# Patient Record
Sex: FEMALE | Race: WHITE | NOT HISPANIC OR LATINO | Employment: FULL TIME | URBAN - METROPOLITAN AREA
[De-identification: names, ages, dates, MRNs, and addresses within clinical notes are randomized per-mention and may not be internally consistent; named-entity substitution may affect disease eponyms.]

---

## 2018-12-07 ENCOUNTER — APPOINTMENT (OUTPATIENT)
Dept: WOMENS IMAGING | Facility: HOSPITAL | Age: 63
End: 2018-12-07

## 2018-12-07 PROCEDURE — 77067 SCR MAMMO BI INCL CAD: CPT | Performed by: RADIOLOGY

## 2018-12-07 PROCEDURE — 77063 BREAST TOMOSYNTHESIS BI: CPT | Performed by: RADIOLOGY

## 2021-09-24 ENCOUNTER — APPOINTMENT (OUTPATIENT)
Dept: WOMENS IMAGING | Facility: HOSPITAL | Age: 66
End: 2021-09-24

## 2021-09-24 PROCEDURE — 77067 SCR MAMMO BI INCL CAD: CPT | Performed by: RADIOLOGY

## 2021-09-24 PROCEDURE — 77063 BREAST TOMOSYNTHESIS BI: CPT | Performed by: RADIOLOGY

## 2021-11-12 ENCOUNTER — APPOINTMENT (OUTPATIENT)
Dept: WOMENS IMAGING | Facility: HOSPITAL | Age: 66
End: 2021-11-12

## 2021-11-12 PROCEDURE — 76641 ULTRASOUND BREAST COMPLETE: CPT | Performed by: RADIOLOGY

## 2021-11-12 PROCEDURE — 77066 DX MAMMO INCL CAD BI: CPT | Performed by: RADIOLOGY

## 2021-11-12 PROCEDURE — G0279 TOMOSYNTHESIS, MAMMO: HCPCS | Performed by: RADIOLOGY

## 2021-11-12 PROCEDURE — 77062 BREAST TOMOSYNTHESIS BI: CPT | Performed by: RADIOLOGY

## 2021-12-20 ENCOUNTER — APPOINTMENT (OUTPATIENT)
Dept: WOMENS IMAGING | Facility: HOSPITAL | Age: 66
End: 2021-12-20

## 2021-12-20 PROCEDURE — 19083 BX BREAST 1ST LESION US IMAG: CPT | Performed by: RADIOLOGY

## 2021-12-20 PROCEDURE — A4648 IMPLANTABLE TISSUE MARKER: HCPCS | Performed by: RADIOLOGY

## 2022-01-14 ENCOUNTER — TELEMEDICINE (OUTPATIENT)
Dept: FAMILY MEDICINE CLINIC | Facility: TELEHEALTH | Age: 67
End: 2022-01-14

## 2022-01-14 ENCOUNTER — HOSPITAL ENCOUNTER (EMERGENCY)
Facility: HOSPITAL | Age: 67
Discharge: HOME | End: 2022-01-14
Attending: EMERGENCY MEDICINE | Admitting: EMERGENCY MEDICINE

## 2022-01-14 VITALS
RESPIRATION RATE: 14 BRPM | HEART RATE: 64 BPM | OXYGEN SATURATION: 97 % | SYSTOLIC BLOOD PRESSURE: 108 MMHG | DIASTOLIC BLOOD PRESSURE: 57 MMHG | TEMPERATURE: 97.9 F

## 2022-01-14 VITALS — BODY MASS INDEX: 28.52 KG/M2 | WEIGHT: 155 LBS | HEIGHT: 62 IN

## 2022-01-14 DIAGNOSIS — R06.2 WHEEZING: ICD-10-CM

## 2022-01-14 DIAGNOSIS — U07.1 COVID-19 VIRUS INFECTION: Primary | ICD-10-CM

## 2022-01-14 PROBLEM — I10 BENIGN ESSENTIAL HYPERTENSION: Status: ACTIVE | Noted: 2017-02-11

## 2022-01-14 PROBLEM — R29.818 FINE MOTOR IMPAIRMENT: Status: ACTIVE | Noted: 2022-01-14

## 2022-01-14 PROBLEM — Z95.0 CARDIAC PACEMAKER IN SITU: Status: ACTIVE | Noted: 2017-06-12

## 2022-01-14 PROBLEM — Z09 OTHER FOLLOW-UP EXAMINATION: Status: ACTIVE | Noted: 2017-07-04

## 2022-01-14 PROBLEM — I10 HYPERTENSIVE DISORDER: Status: ACTIVE | Noted: 2017-06-12

## 2022-01-14 PROBLEM — I48.91 ATRIAL FIBRILLATION: Status: ACTIVE | Noted: 2017-06-12

## 2022-01-14 PROBLEM — Z86.73 HISTORY OF CVA (CEREBROVASCULAR ACCIDENT): Status: ACTIVE | Noted: 2017-02-10

## 2022-01-14 PROBLEM — K21.9 GASTROESOPHAGEAL REFLUX DISEASE WITHOUT ESOPHAGITIS: Status: ACTIVE | Noted: 2017-06-12

## 2022-01-14 PROBLEM — R26.9 ABNORMAL GAIT: Status: ACTIVE | Noted: 2022-01-14

## 2022-01-14 PROBLEM — Z72.0 TOBACCO ABUSE: Status: ACTIVE | Noted: 2017-02-11

## 2022-01-14 PROBLEM — R41.89 IMPAIRED COGNITION: Status: ACTIVE | Noted: 2022-01-14

## 2022-01-14 PROBLEM — M54.50 LBP (LOW BACK PAIN): Status: ACTIVE | Noted: 2022-01-14

## 2022-01-14 PROBLEM — F41.8 MIXED ANXIETY AND DEPRESSIVE DISORDER: Status: ACTIVE | Noted: 2017-06-12

## 2022-01-14 PROBLEM — R89.9 ABNORMAL LABORATORY TEST RESULT: Status: ACTIVE | Noted: 2019-10-21

## 2022-01-14 PROBLEM — R26.89 IMPAIRMENT OF BALANCE: Status: ACTIVE | Noted: 2022-01-14

## 2022-01-14 PROBLEM — D50.9 IRON DEFICIENCY ANEMIA: Status: ACTIVE | Noted: 2019-10-21

## 2022-01-14 PROBLEM — E66.3 OVERWEIGHT WITH BODY MASS INDEX (BMI) 25.0-29.9: Status: ACTIVE | Noted: 2017-06-12

## 2022-01-14 PROBLEM — I49.5 SICK SINUS SYNDROME: Status: ACTIVE | Noted: 2017-04-07

## 2022-01-14 PROBLEM — M54.16 LUMBAR RADICULOPATHY: Status: ACTIVE | Noted: 2017-08-25

## 2022-01-14 PROBLEM — I47.1 PSVT (PAROXYSMAL SUPRAVENTRICULAR TACHYCARDIA) (HCC): Status: ACTIVE | Noted: 2017-04-07

## 2022-01-14 PROBLEM — M81.0 OSTEOPOROSIS: Status: ACTIVE | Noted: 2020-11-13

## 2022-01-14 PROBLEM — R00.1 SINUS BRADYCARDIA: Status: ACTIVE | Noted: 2017-02-11

## 2022-01-14 PROBLEM — G43.909 HEADACHE, MIGRAINE: Status: ACTIVE | Noted: 2022-01-14

## 2022-01-14 PROBLEM — R29.818 SUSPECTED SLEEP APNEA: Status: ACTIVE | Noted: 2017-02-13

## 2022-01-14 PROBLEM — Z84.89 FAMILY HISTORY OF NEOPLASM OF OVARY: Status: ACTIVE | Noted: 2017-09-15

## 2022-01-14 PROBLEM — E78.5 HYPERLIPIDEMIA: Status: ACTIVE | Noted: 2017-06-12

## 2022-01-14 PROBLEM — R29.898 FINE MOTOR IMPAIRMENT: Status: ACTIVE | Noted: 2022-01-14

## 2022-01-14 PROBLEM — Z78.9 ALTERATION IN INSTRUMENTAL ACTIVITIES OF DAILY LIVING (IADL): Status: ACTIVE | Noted: 2022-01-14

## 2022-01-14 PROCEDURE — 99282 EMERGENCY DEPT VISIT SF MDM: CPT

## 2022-01-14 PROCEDURE — 99213 OFFICE O/P EST LOW 20 MIN: CPT | Performed by: NURSE PRACTITIONER

## 2022-01-14 RX ORDER — DENOSUMAB 60 MG/ML
INJECTION SUBCUTANEOUS
COMMUNITY

## 2022-01-14 RX ORDER — METOPROLOL SUCCINATE 25 MG/1
25 TABLET, EXTENDED RELEASE ORAL DAILY
COMMUNITY
Start: 2021-10-31

## 2022-01-14 RX ORDER — GABAPENTIN 400 MG/1
400 CAPSULE ORAL 3 TIMES DAILY
COMMUNITY
Start: 2021-10-22

## 2022-01-14 RX ORDER — DIPHENHYDRAMINE HYDROCHLORIDE 50 MG/ML
50 INJECTION INTRAMUSCULAR; INTRAVENOUS ONCE AS NEEDED
OUTPATIENT
Start: 2022-01-14

## 2022-01-14 RX ORDER — LISINOPRIL 5 MG/1
TABLET ORAL
COMMUNITY

## 2022-01-14 RX ORDER — CLOPIDOGREL BISULFATE 75 MG/1
TABLET ORAL
COMMUNITY
Start: 2021-08-17

## 2022-01-14 RX ORDER — CYCLOBENZAPRINE HCL 10 MG
TABLET ORAL
COMMUNITY
Start: 2013-05-08

## 2022-01-14 RX ORDER — METHYLPREDNISOLONE SODIUM SUCCINATE 125 MG/2ML
125 INJECTION, POWDER, LYOPHILIZED, FOR SOLUTION INTRAMUSCULAR; INTRAVENOUS AS NEEDED
OUTPATIENT
Start: 2022-01-14

## 2022-01-14 RX ORDER — ESCITALOPRAM OXALATE 20 MG/1
TABLET ORAL
COMMUNITY
Start: 2012-11-30

## 2022-01-14 RX ORDER — EPINEPHRINE 1 MG/ML
0.3 INJECTION, SOLUTION, CONCENTRATE INTRAVENOUS AS NEEDED
OUTPATIENT
Start: 2022-01-14

## 2022-01-14 RX ORDER — RISEDRONATE SODIUM 150 MG/1
TABLET, FILM COATED ORAL
COMMUNITY
Start: 2013-05-08

## 2022-01-14 RX ORDER — DIPHENHYDRAMINE HCL 25 MG
50 TABLET ORAL ONCE AS NEEDED
OUTPATIENT
Start: 2022-01-14

## 2022-01-14 RX ORDER — SODIUM CHLORIDE 9 MG/ML
30 INJECTION, SOLUTION INTRAVENOUS ONCE
OUTPATIENT
Start: 2022-01-14

## 2022-01-14 RX ORDER — ALPRAZOLAM 0.5 MG/1
TABLET ORAL
COMMUNITY
Start: 2013-02-28

## 2022-01-14 RX ORDER — ROSUVASTATIN CALCIUM 20 MG/1
TABLET, COATED ORAL
COMMUNITY

## 2022-01-14 RX ORDER — ERGOCALCIFEROL 1.25 MG/1
CAPSULE ORAL
COMMUNITY

## 2022-01-14 RX ORDER — METHYLPREDNISOLONE 4 MG/1
TABLET ORAL
Qty: 21 TABLET | Refills: 0 | Status: SHIPPED | OUTPATIENT
Start: 2022-01-14

## 2022-01-14 NOTE — PATIENT INSTRUCTIONS
If not able to get Antibody infusion, you may try the Medrol dose pack fr wheezing.   Continue to treat symptoms.   Alternate tylenol and motrin for pain and/or fever, stay hydrated and rest.   Warning signs for COVID-19: trouble breathing, increasing shortness of breath, persistent chest pain or pressure, new confusion, inability to stay awake, pale, gray or blue-colored skin, lips or nail beds. If you show any of these signs seek Emergency Care.   If symptoms worsen or do not improve follow up with your PCP or visit your nearest Urgent Care Center or ER.    COVID QUARANTINE GUIDELIINES    Positive COVID result:  Isolate for 10 days from the date your symptoms began.  If symptoms fully resolve, you may end Isolation after 5 days from the onset of symptoms and wear a well-fitted mask for the additional 5 days.   If you can not wear a well-fitted mask AT ALL TIMES, you must remain in isolation for a full 10 days from the onset of symptoms.     If you have a Positive COVID result and NEVER had symptoms:  Isolate for 5 days from the date you had a positive test.   Wear a well-fitted mask for an additional 5 days.   If you can not wear a well fitted mask AT ALL TIMES, you must remain in isolation for the full 10 days from the onset of symptoms.       If you are NOT fully Vaccinated or had a Booster shot if eligible, but have had COVID EXPOSURE:  Quarantine for 10 days from the last day of exposure  Quarantine may be shortened if you have no symptoms and test Negative on day 5 post exposure.   Wear a well-fitted mask for 10 days from the last day of exposure.  If symptoms develop, stay home and get re-tested.     If HAVE been fully Vaccinated and had a Booster shot if eligible, but have had COVID EXPOSURE and have No Symptoms:  You do NOT need to quarantine  Wear a well-fitted mask for 10 days from the last day of exposure.  Get a COVID test on day 5.  If symptoms develop, stay home and get re-tested.           Viral  Respiratory Infection  A respiratory infection is an illness that affects part of the respiratory system, such as the lungs, nose, or throat. A respiratory infection that is caused by a virus is called a viral respiratory infection.  Common types of viral respiratory infections include:  · A cold.  · The flu (influenza).  · A respiratory syncytial virus (RSV) infection.  What are the causes?  This condition is caused by a virus.  What are the signs or symptoms?  Symptoms of this condition include:  · A stuffy or runny nose.  · Yellow or green nasal discharge.  · A cough.  · Sneezing.  · Fatigue.  · Achy muscles.  · A sore throat.  · Sweating or chills.  · A fever.  · A headache.  How is this diagnosed?  This condition may be diagnosed based on:  · Your symptoms.  · A physical exam.  · Testing of nasal swabs.  How is this treated?  This condition may be treated with medicines, such as:  · Antiviral medicine. This may shorten the length of time a person has symptoms.  · Expectorants. These make it easier to cough up mucus.  · Decongestant nasal sprays.  · Acetaminophen or NSAIDs to relieve fever and pain.  Antibiotic medicines are not prescribed for viral infections. This is because antibiotics are designed to kill bacteria. They are not effective against viruses.  Follow these instructions at home:    Managing pain and congestion  · Take over-the-counter and prescription medicines only as told by your health care provider.  · If you have a sore throat, gargle with a salt-water mixture 3-4 times a day or as needed. To make a salt-water mixture, completely dissolve ½-1 tsp of salt in 1 cup of warm water.  · Use nose drops made from salt water to ease congestion and soften raw skin around your nose.  · Drink enough fluid to keep your urine pale yellow. This helps prevent dehydration and helps loosen up mucus.  General instructions  · Rest as much as possible.  · Do not drink alcohol.  · Do not use any products that  contain nicotine or tobacco, such as cigarettes and e-cigarettes. If you need help quitting, ask your health care provider.  · Keep all follow-up visits as told by your health care provider. This is important.  How is this prevented?    · Get an annual flu shot. You may get the flu shot in late summer, fall, or winter. Ask your health care provider when you should get your flu shot.  · Avoid exposing others to your respiratory infection.  ? Stay home from work or school as told by your health care provider.  ? Wash your hands with soap and water often, especially after you cough or sneeze. If soap and water are not available, use alcohol-based hand .  · Avoid contact with people who are sick during cold and flu season. This is generally fall and winter.  Contact a health care provider if:  · Your symptoms last for 10 days or longer.  · Your symptoms get worse over time.  · You have a fever.  · You have severe sinus pain in your face or forehead.  · The glands in your jaw or neck become very swollen.  Get help right away if you:  · Feel pain or pressure in your chest.  · Have shortness of breath.  · Faint or feel like you will faint.  · Have severe and persistent vomiting.  · Feel confused or disoriented.  Summary  · A respiratory infection is an illness that affects part of the respiratory system, such as the lungs, nose, or throat. A respiratory infection that is caused by a virus is called a viral respiratory infection.  · Common types of viral respiratory infections are a cold, influenza, and respiratory syncytial virus (RSV) infection.  · Symptoms of this condition include a stuffy or runny nose, cough, sneezing, fatigue, achy muscles, sore throat, and fevers or chills.  · Antibiotic medicines are not prescribed for viral infections. This is because antibiotics are designed to kill bacteria. They are not effective against viruses.  This information is not intended to replace advice given to you by your  health care provider. Make sure you discuss any questions you have with your health care provider.  Document Revised: 12/26/2019 Document Reviewed: 01/28/2019  Elsevier Patient Education © 2021 [employer]    10 Things You Can Do to Manage Your COVID-19 Symptoms at Home  If you have possible or confirmed COVID-19:  1. Stay home except to get medical care.  2. Monitor your symptoms carefully. If your symptoms get worse, call your healthcare provider immediately.  3. Get rest and stay hydrated.  4. If you have a medical appointment, call the healthcare provider ahead of time and tell them that you have or may have COVID-19.  5. For medical emergencies, call 911 and notify the dispatch personnel that you have or may have COVID-19.  6. Cover your cough and sneezes with a tissue or use the inside of your elbow.  7. Wash your hands often with soap and water for at least 20 seconds or clean your hands with an alcohol-based hand  that contains at least 60% alcohol.  8. As much as possible, stay in a specific room and away from other people in your home. Also, you should use a separate bathroom, if available. If you need to be around other people in or outside of the home, wear a mask.  9. Avoid sharing personal items with other people in your household, like dishes, towels, and bedding.  10. Clean all surfaces that are touched often, like counters, tabletops, and doorknobs. Use household cleaning sprays or wipes according to the label instructions.  cdc.gov/coronavirus  07/16/2021  This information is not intended to replace advice given to you by your health care provider. Make sure you discuss any questions you have with your health care provider.  Document Revised: 08/04/2021 Document Reviewed: 08/04/2021  Elsevier Patient Education © 2021 [employer]    How to Quarantine at Home  Information for Patients and Families    These instructions are for people with confirmed or suspected COVID-19 who do not need  to be hospitalized and those with confirmed COVID-19 who were hospitalized and discharged to care for themselves at home.    If you were tested through the Health Department  The Health Department will monitor your wellbeing.  If it is determined that you do not need to be hospitalized and can be isolated at home, you will be monitored by staff from your local or state health department.     If you were tested through a Commercial [facility]  You will need to monitor yourself and report changes in your symptoms to your doctor.  See the section below called Monitor Your Symptoms.    Follow these steps until a healthcare provider or local or Replaced by [facility] health department says you can return to your normal activities.    Stay home except to get medical care  • Restrict activities outside your home, except for getting medical care.   • Do not go to work, school, or public areas.   • Avoid using public transportation, ride-sharing, or taxis.    Separate yourself from other people and animals in your home  People  As much as possible, you should stay in a specific room and away from other people in your home. Also, you should use a separate bathroom, if available.    Animals  You should restrict contact with pets and other animals while you are sick with COVID-19, just like you would around other people. When possible, have another member of your household care for your animals while you are sick. If you are sick with COVID-19, avoid contact with your pet, including petting, snuggling, being kissed or licked, and sharing food. If you must care for your pet or be around animals while you are sick, wash your hands before and after you interact with pets and wear a facemask. See COVID-19 and Animals for more information.    Call ahead before visiting your doctor  If you have a medical appointment, call the healthcare provider and tell them that you have or may have COVID-19. This information will help the healthcare provider’s office  take steps to keep other people from getting infected or exposed.    Wear a facemask  You should wear a facemask when you are around other people (e.g., sharing a room or vehicle) or pets and before you enter a healthcare provider’s office.     If you are not able to wear a facemask (for example, because it causes trouble breathing), then people who live with you should not stay in the same room with you, or they should wear a facemask if they enter your room.    Cover your coughs and sneezes  • Cover your mouth and nose with a tissue when you cough or sneeze.   • Throw used tissues in a lined trash can.   • Immediately wash your hands with soap and water for at least 20 seconds or, if soap and water are not available, clean your hands with an alcohol-based hand  that contains at least 60% alcohol.    Clean your hands often  • Wash your hands often with soap and water for at least 20 seconds, especially after blowing your nose, coughing, or sneezing; going to the bathroom; and before eating or preparing food.     • If soap and water are not readily available, use an alcohol-based hand  with at least 60% alcohol, covering all surfaces of your hands and rubbing them together until they feel dry.    • Soap and water are the best option if hands are visibly dirty. Avoid touching your eyes, nose, and mouth with unwashed hands.    Avoid sharing personal household items  • You should not share dishes, drinking glasses, cups, eating utensils, towels, or bedding with other people or pets in your home.   • After using these items, they should be washed thoroughly with soap and water.    Clean all “high-touch” surfaces everyday  • High touch surfaces include counters, tabletops, doorknobs, bathroom fixtures, toilets, phones, keyboards, tablets, and bedside tables.   • Also, clean any surfaces that may have blood, stool, or body fluids on them.   • Use a household cleaning spray or wipe, according to the label  instructions. Labels contain instructions for safe and effective use of the cleaning product, including precautions you should take when applying the product, such as wearing gloves and making sure you have good ventilation during use of the product.    Monitor your symptoms  • Seek prompt medical attention if your illness is worsening (e.g., difficulty breathing).   • Before seeking care, call your healthcare provider and tell them that you have, or are being evaluated for, COVID-19.   • Put on a facemask before you enter the facility.     • These steps will help the healthcare provider’s office to keep other people in the office or waiting room from getting infected or exposed.   • Persons who are placed under active monitoring or facilitated self-monitoring should follow instructions provided by their local health department or occupational health professionals, as appropriate.  • If you have a medical emergency and need to call 911, notify the dispatch personnel that you have, or are being evaluated for COVID-19. If possible, put on a facemask before emergency medical services arrive.    Discontinuing home isolation  Patients with confirmed COVID-19 should remain under home isolation precautions until the risk of secondary transmission to others is thought to be low. The decision to discontinue home isolation precautions should be made on a case-by-case basis, in consultation with healthcare providers and state and local health departments.    The below content are for household members, intimate partners, and caregivers of a patient with symptomatic laboratory-confirmed COVID-19 or a patient under investigation:    Household members, intimate partners, and caregivers may have close contact with a person with symptomatic, laboratory-confirmed COVID-19 or a person under investigation.     Close contacts should monitor their health; they should call their healthcare provider right away if they develop symptoms  suggestive of COVID-19 (e.g., fever, cough, shortness of breath)     Close contacts should also follow these recommendations:  • Make sure that you understand and can help the patient follow their healthcare provider’s instructions for medication(s) and care. You should help the patient with basic needs in the home and provide support for getting groceries, prescriptions, and other personal needs.  • Monitor the patient’s symptoms. If the patient is getting sicker, call his or her healthcare provider and tell them that the patient has laboratory-confirmed COVID-19. This will help the healthcare provider’s office take steps to keep other people in the office or waiting room from getting infected. Ask the healthcare provider to call the local or state health department for additional guidance. If the patient has a medical emergency and you need to call 911, notify the dispatch personnel that the patient has, or is being evaluated for COVID-19.  • Household members should stay in another room or be  from the patient as much as possible. Household members should use a separate bedroom and bathroom, if available.  • Prohibit visitors who do not have an essential need to be in the home.  • Household members should care for any pets in the home. Do not handle pets or other animals while sick.  For more information, see COVID-19 and Animals.  • Make sure that shared spaces in the home have good air flow, such as by an air conditioner or an opened window, weather permitting.  • Perform hand hygiene frequently. Wash your hands often with soap and water for at least 20 seconds or use an alcohol-based hand  that contains 60 to 95% alcohol, covering all surfaces of your hands and rubbing them together until they feel dry. Soap and water should be used preferentially if hands are visibly dirty.  • Avoid touching your eyes, nose, and mouth with unwashed hands.  • The patient should wear a facemask when you are  around other people. If the patient is not able to wear a facemask (for example, because it causes trouble breathing), you, as the caregiver, should wear a mask when you are in the same room as the patient.  • Wear a disposable facemask and gloves when you touch or have contact with the patient’s blood, stool, or body fluids, such as saliva, sputum, nasal mucus, vomit, or urine.   o Throw out disposable facemasks and gloves after using them. Do not reuse.  o When removing personal protective equipment, first remove and dispose of gloves. Then, immediately clean your hands with soap and water or alcohol-based hand . Next, remove and dispose of facemask, and immediately clean your hands again with soap and water or alcohol-based hand .  • Avoid sharing household items with the patient. You should not share dishes, drinking glasses, cups, eating utensils, towels, bedding, or other items. After the patient uses these items, you should wash them thoroughly (see below “Wash laundry thoroughly”).  • Clean all “high-touch” surfaces, such as counters, tabletops, doorknobs, bathroom fixtures, toilets, phones, keyboards, tablets, and bedside tables, every day. Also, clean any surfaces that may have blood, stool, or body fluids on them.   o Use a household cleaning spray or wipe, according to the label instructions. Labels contain instructions for safe and effective use of the cleaning product including precautions you should take when applying the product, such as wearing gloves and making sure you have good ventilation during use of the product.  • Wash laundry thoroughly.   o Immediately remove and wash clothes or bedding that have blood, stool, or body fluids on them.  o Wear disposable gloves while handling soiled items and keep soiled items away from your body. Clean your hands (with soap and water or an alcohol-based hand ) immediately after removing your gloves.  o Read and follow directions  on labels of laundry or clothing items and detergent. In general, using a normal laundry detergent according to washing machine instructions and dry thoroughly using the warmest temperatures recommended on the clothing label.  • Place all used disposable gloves, facemasks, and other contaminated items in a lined container before disposing of them with other household waste. Clean your hands (with soap and water or an alcohol-based hand ) immediately after handling these items. Soap and water should be used preferentially if hands are visibly dirty.  • Discuss any additional questions with your state or local health department or healthcare provider.    Adapted from information provided by the Centers for Disease Control and Prevention.  For more information, visit https://www.cdc.gov/coronavirus/2019-ncov/hcp/guidance-prevent-spread.html  Sotrovimab Injection  What is this medicine?  SOTROVIMAB (RIKKI troe vi mab) is a monoclonal antibody. It is used to treat COVID-19 in patients who are not hospitalized. It is for patients at risk of getting severe symptoms of COVID-19. It may decrease the risk of developing severe symptoms of COVID-19. It may also decrease the chance of going to the hospital. This medicine is not approved by the FDA. The FDA has authorized emergency use of this medicine during the COVID-19 pandemic.  This medicine may be used for other purposes; ask your health care provider or pharmacist if you have questions.  What should I tell my health care provider before I take this medicine?  They need to know if you have any of these conditions:  · any allergies  · any serious illness  · have received a COVID-19 vaccine  · an unusual or allergic reaction to sotrovimab, other medicines, foods, dyes, or preservatives  · pregnant or trying to get pregnant  · breast-feeding  How should I use this medicine?  This medicine is injected into a vein. It is given by a health care provider in a hospital or  clinic setting.  Talk to your health care provider about the use of this medicine in children. While it may be prescribed for children as young as 12 years of age for selected conditions, precautions do apply.  Overdosage: If you think you have taken too much of this medicine contact a poison control center or emergency room at once.  NOTE: This medicine is only for you. Do not share this medicine with others.  What if I miss a dose?  This does not apply. This medicine is not for regular use.  What may interact with this medicine?  · COVID-19 vaccines  This list may not describe all possible interactions. Give your health care provider a list of all the medicines, herbs, non-prescription drugs, or dietary supplements you use. Also tell them if you smoke, drink alcohol, or use illegal drugs. Some items may interact with your medicine.  What should I watch for while using this medicine?  Your condition will be monitored carefully while you are receiving this medicine. Visit your health care provider for regular checks on your progress. Tell your health care provider if your symptoms do not start to get better or if they get worse.  After receiving this medicine, wait at least 90 days before getting the COVID-19 vaccine. If you have already received your first dose of the COVID-19 vaccine, wait at least 90 days after getting this medicine before getting your second dose of vaccine.  What side effects may I notice from receiving this medicine?  Side effects that you should report to your doctor or health care professional as soon as possible:  · allergic reactions (skin rash, itching or hives; swelling of the face, lips, or tongue)  · fast or slow heartbeat  · increase in blood pressure  · infusion-related reactions (chest pain or chest tightness, chills, fever, trouble breathing)  · low blood pressure (dizziness; feeling faint or lightheaded, falls; unusually weak or tired)  · wheezing (trouble breathing with loud or  whistling sounds)  Side effects that usually do not require medical attention (report these to your doctor or health care professional if they continue or are bothersome):  · diarrhea  · pain, redness, or irritation at site where injected  This list may not describe all possible side effects. Call your doctor for medical advice about side effects. You may report side effects to FDA at 7-353-CYD-4316.  Where should I keep my medicine?  This drug is given in a hospital or clinic. It will not be stored at home.  NOTE: This sheet is a summary. It may not cover all possible information. If you have questions about this medicine, talk to your doctor, pharmacist, or health care provider.  © 2021 [employer] (2021-05-28 12:38:06)

## 2022-01-14 NOTE — PROGRESS NOTES
Subjective   Chief Complaint   Patient presents with   • covid positive   • URI       Marianela Griffin is a 66 y.o. female.     Pt presents with daughter for Monoclonal antibody infusion. Pt tested positive for COVID in the ER 6 days ago which is the day symptoms began. She is experiencing, cough, chest congestion, fever, HA, wheezing. She has not been vax against COVID.     URI   This is a new problem. Episode onset: 6 days. The problem has been gradually worsening. Maximum temperature: max 101. Associated symptoms include coughing, headaches and wheezing. Pertinent negatives include no abdominal pain, chest pain, congestion, diarrhea, dysuria, ear pain, joint pain, joint swelling, nausea, neck pain, plugged ear sensation, rash, rhinorrhea, sinus pain, sneezing, sore throat, swollen glands or vomiting. Treatments tried: virmaines, tylenol, clarithromycin, will start Budesomide tonight. The treatment provided mild relief.        No Known Allergies    Past Medical History:   Diagnosis Date   • A-fib (HCC)    • Anemia    • Back pain    • Bradycardia    • GERD (gastroesophageal reflux disease)    • Hypertension    • Migraine    • Osteoporosis    • PSVT (paroxysmal supraventricular tachycardia) ([facility])    • Sick sinus syndrome ([facility])    • Sleep apnea        Past Surgical History:   Procedure Laterality Date   • PACEMAKER IMPLANTATION         Social History     Socioeconomic History   • Marital status: Unknown   Tobacco Use   • Smoking status: Former Smoker   • Smokeless tobacco: Never Used   • Tobacco comment: recently quit   Substance and Sexual Activity   • Alcohol use: Defer   • Drug use: Defer   • Sexual activity: Defer       History reviewed. No pertinent family history.      Current Outpatient Medications:   •  ALPRAZolam (Xanax) 0.5 MG tablet, Xanax 0.5 mg tablet  As needed, Disp: , Rfl:   •  clopidogrel (Plavix) 75 MG tablet, Plavix 75 mg tablet  Take 1 tablet every day by oral route., Disp: , Rfl:   •   cyclobenzaprine (FLEXERIL) 10 MG tablet, cyclobenzaprine 10 mg tablet  TAKE 1 TABLET BY MOUTH THREE TIMES DAILY AS NEEDED, Disp: , Rfl:   •  escitalopram (LEXAPRO) 20 MG tablet, escitalopram 20 mg tablet  Take 1 tablet every day by oral route., Disp: , Rfl:   •  gabapentin (NEURONTIN) 400 MG capsule, Take 400 mg by mouth 3 (Three) Times a Day., Disp: , Rfl:   •  risedronate (Actonel) 150 MG tablet, Take  by mouth., Disp: , Rfl:   •  ascorbic acid (VITAMIN C) 100 MG tablet, , Disp: , Rfl:   •  Cetirizine HCl 10 MG tablet dispersible, , Disp: , Rfl:   •  Cholecalciferol 50 MCG (2000 UT) tablet, Vitamin D3 50 mcg (2,000 unit) tablet  Take 2 tablets every day by oral route., Disp: , Rfl:   •  cyanocobalamin (VITAMIN B-12) 2000 MCG tablet, , Disp: , Rfl:   •  denosumab (Prolia) 60 MG/ML solution prefilled syringe syringe, , Disp: , Rfl:   •  ergocalciferol (ERGOCALCIFEROL) 1.25 MG (03115 UT) capsule, , Disp: , Rfl:   •  lisinopril (PRINIVIL,ZESTRIL) 5 MG tablet, , Disp: , Rfl:   •  methylPREDNISolone (MEDROL) 4 MG dose pack, Take as directed on package instructions., Disp: 21 tablet, Rfl: 0  •  metoprolol succinate XL (TOPROL-XL) 25 MG 24 hr tablet, Take 25 mg by mouth Daily., Disp: , Rfl:   •  rosuvastatin (Crestor) 20 MG tablet, Crestor 20 mg tablet  Take 1 tablet every day by oral route., Disp: , Rfl:       Review of Systems   Constitutional: Positive for fatigue. Negative for chills, diaphoresis and fever.   HENT: Negative for congestion, ear pain, rhinorrhea, sneezing, sore throat and swollen glands.    Respiratory: Positive for cough and wheezing. Negative for chest tightness and shortness of breath.    Cardiovascular: Negative for chest pain.   Gastrointestinal: Negative for abdominal pain, diarrhea, nausea and vomiting.   Genitourinary: Negative for dysuria.   Musculoskeletal: Negative for joint pain, myalgias and neck pain.   Skin: Negative for rash.   Neurological: Positive for headache.        Vitals:     "01/14/22 1537   Weight: 70.3 kg (155 lb)   Height: 157.5 cm (62\")       Objective   Physical Exam  Constitutional:       General: She is not in acute distress.     Appearance: Normal appearance. She is not ill-appearing, toxic-appearing or diaphoretic.   HENT:      Head: Normocephalic.      Mouth/Throat:      Lips: Pink.      Mouth: Mucous membranes are moist.   Pulmonary:      Effort: Pulmonary effort is normal.   Neurological:      Mental Status: She is alert and oriented to person, place, and time.   Psychiatric:         Mood and Affect: Mood normal.         Behavior: Behavior normal.          Procedures     Assessment/Plan   Diagnoses and all orders for this visit:    1. COVID-19 virus infection (Primary)  -     Ambulatory Referral to [facility] For Infusion Treatment    2. Wheezing    Other orders  -     methylPREDNISolone (MEDROL) 4 MG dose pack; Take as directed on package instructions.  Dispense: 21 tablet; Refill: 0      If not able to get Antibody infusion, you may try the Medrol dose pack fr wheezing.   Continue to treat symptoms.   Alternate tylenol and motrin for pain and/or fever, stay hydrated and rest.   Warning signs for COVID-19: trouble breathing, increasing shortness of breath, persistent chest pain or pressure, new confusion, inability to stay awake, pale, gray or blue-colored skin, lips or nail beds. If you show any of these signs seek Emergency Care.   If symptoms worsen or do not improve follow up with your PCP or visit your nearest Urgent Care Center or ER.    COVID QUARANTINE GUIDELIINES    Positive COVID result:  Isolate for 10 days from the date your symptoms began.  If symptoms fully resolve, you may end Isolation after 5 days from the onset of symptoms and wear a well-fitted mask for the additional 5 days.   If you can not wear a well-fitted mask AT ALL TIMES, you must remain in isolation for a full 10 days from the onset of symptoms.     If you have a Positive COVID result and NEVER had " symptoms:  Isolate for 5 days from the date you had a positive test.   Wear a well-fitted mask for an additional 5 days.   If you can not wear a well fitted mask AT ALL TIMES, you must remain in isolation for the full 10 days from the onset of symptoms.       If you are NOT fully Vaccinated or had a Booster shot if eligible, but have had COVID EXPOSURE:  Quarantine for 10 days from the last day of exposure  Quarantine may be shortened if you have no symptoms and test Negative on day 5 post exposure.   Wear a well-fitted mask for 10 days from the last day of exposure.  If symptoms develop, stay home and get re-tested.     If HAVE been fully Vaccinated and had a Booster shot if eligible, but have had COVID EXPOSURE and have No Symptoms:  You do NOT need to quarantine  Wear a well-fitted mask for 10 days from the last day of exposure.  Get a COVID test on day 5.  If symptoms develop, stay home and get re-tested.     No results found for this or any previous visit.    PLAN:  Pt was given the Fact sheet for Sotrovimab and verbally agreed to treatment. She declined Remdesivir, she states her Cardiologist advised against this drug with her cardiac history. Attempted to get pt scheduled for MA infusion with Sotrovimab. Spoke with scheduling in [place] who advised that there is no availability today for the infusion and that the pt is on day 7 of symptoms (they count the first day as day 1). The patient must be scheduled within 7 days of symptom onset, because of this she cannot be scheduled for tomorrow. Advised the pt and daughter of this, they verbalized understanding. Discussed dosing, side effects, recommended other symptomatic care.  Patient should follow up with primary care provider if symptoms worsen, fail to resolve or other symptoms need attention. Patient/family agree to the above.         CHALINO Whelan     This visit was performed via Telehealth.  This patient has been instructed to follow-up with  their primary care provider if their symptoms worsen or the treatment provided does not resolve their illness.

## 2022-01-15 NOTE — ED TRIAGE NOTES
Diagnosed Saturday with COVID. Was told by telemedicine nurse practitioner to go to Emergency Dept for monoclonal antibody injection. Patient drove from [place] for this.

## 2022-01-15 NOTE — ED PROVIDER NOTES
EMERGENCY DEPARTMENT ENCOUNTER    Room Number:  09/09  Date seen:  1/14/2022  PCP: Provider, No Known  Historian: Patient      HPI:  Chief Complaint: COVID infection, cough  A complete HPI/ROS/PMH/PSH/SH/FH are unobtainable due to: Nothing  Context: Marianela Griffin is a 66 y.o. female who presents to the ED c/o cough.  She reports that last Friday she began having body ache, fever, chills.  She thought she may have had the flu but went to work and they would not allow her to work because of her symptoms and then they obtained a COVID test which was positive.  She has received 1 COVID-vaccine in September of this year but had not yet received her booster.  She reports that she had fever and body aches for several days and briefly had some diarrhea.  She has had a persistent dry cough.  She denies shortness of breath.  She has a home pulse ox meter and reports that occasionally will read around 88 to 90% when she is lying down in bed but when she gets up to move around or take some deep breaths it will read 95 to 96%.  She denies chest pain.  She has a history of A. fib and pacemaker.  Patient resides in [facility].  She reports that she was instructed by a nurse practitioner to come here today to receive a monoclonal antibody infusion.  That is the primary reason for her visit.            PAST MEDICAL HISTORY  Active Ambulatory Problems     Diagnosis Date Noted   • COVID-19 virus infection 01/14/2022   • Abnormal gait 01/14/2022   • Abnormal laboratory test result 10/21/2019   • Gastroesophageal reflux disease without esophagitis 06/12/2017   • Alteration in instrumental activities of daily living (IADL) 01/14/2022   • Anemia 10/24/2016   • Mixed anxiety and depressive disorder 06/12/2017   • Benign essential hypertension 02/11/2017   • Cardiac pacemaker in situ 06/12/2017   • Family history of neoplasm of ovary 09/15/2017   • Fine motor impairment 01/14/2022   • Headache, migraine 01/14/2022   • History of  CVA (cerebrovascular accident) 02/10/2017   • Hyperlipidemia 06/12/2017   • Hypertensive disorder 06/12/2017   • Impaired cognition 01/14/2022   • Impairment of balance 01/14/2022   • Iron deficiency anemia secondary to inadequate dietary iron intake 12/09/2015   • Iron deficiency anemia 10/21/2019   • LBP (low back pain) 01/14/2022   • Lumbar radiculopathy 08/25/2017   • Osteoporosis 11/13/2020   • Other follow-up examination 07/04/2017   • Overweight with body mass index (BMI) 25.0-29.9 06/12/2017   • Paraesophageal hernia 04/10/2015   • Atrial fibrillation ([facility]) 06/12/2017   • PSVT (paroxysmal supraventricular tachycardia) ([facility]) 04/07/2017   • Sick sinus syndrome ([facility]) 04/07/2017   • Sinus bradycardia 02/11/2017   • Suspected sleep apnea 02/13/2017   • Tobacco abuse 02/11/2017   • Vitamin D deficiency 12/09/2015     Resolved Ambulatory Problems     Diagnosis Date Noted   • No Resolved Ambulatory Problems     Past Medical History:   Diagnosis Date   • A-fib (HCC)    • Back pain    • Bradycardia    • GERD (gastroesophageal reflux disease)    • Hypertension    • Migraine    • Sleep apnea          PAST SURGICAL HISTORY  Past Surgical History:   Procedure Laterality Date   • PACEMAKER IMPLANTATION           FAMILY HISTORY  No family history on file.      SOCIAL HISTORY  Social History     Socioeconomic History   • Marital status: Legally    Tobacco Use   • Smoking status: Former Smoker   • Smokeless tobacco: Never Used   • Tobacco comment: recently quit   Substance and Sexual Activity   • Alcohol use: Defer   • Drug use: Defer   • Sexual activity: Defer         ALLERGIES  Patient has no known allergies.        REVIEW OF SYSTEMS  Review of Systems   Review of all 14 systems is negative other than stated in the HPI above.      PHYSICAL EXAM  ED Triage Vitals [01/14/22 2104]   Temp Heart Rate Resp BP SpO2   97.9 °F (36.6 °C) 67 14 108/57 97 %      Temp src Heart Rate Source Patient Position BP Location FiO2 (%)    -- Monitor Standing Left arm --         GENERAL: Awake and alert, no acute distress, well-appearing  HENT: nares patent  EYES: no scleral icterus, EOMI  CV: regular rhythm, normal rate, no murmur, pacemaker left anterior chest wall  RESPIRATORY: normal effort, lungs clear to auscultation bilaterally  ABDOMEN: soft, nondistended, nontender throughout MUSCULOSKELETAL: no deformity, no peripheral edema  NEURO: alert, moves all extremities, follows commands  PSYCH:  calm, cooperative  SKIN: warm, dry, normal to inspection, no rash    Vital signs and nursing notes reviewed.          LAB RESULTS  No results found for this or any previous visit (from the past 24 hour(s)).    Ordered the above labs and reviewed the results.        RADIOLOGY  No Radiology Exams Resulted Within Past 24 Hours    Ordered the above noted radiological studies. Reviewed by me in PACS.            PROCEDURES  Procedures              MEDICATIONS GIVEN IN ER  Medications - No data to display                MEDICAL DECISION MAKING, PROGRESS, and CONSULTS    All labs have been independently reviewed by me.  All radiology studies have been reviewed by me and discussed with radiologist dictating the report.   EKG's independently viewed and interpreted by me.  Discussion below represents my analysis of pertinent findings related to patient's condition, differential diagnosis, treatment plan and final disposition.      Differential diagnosis includes but is not limited to:  COVID-pneumonia  COVID virus infection       Patient presents with known COVID infection diagnosed 8 days ago.  She is actually feeling better.  She has had a dry cough but denies shortness of breath.  O2 saturations currently 97% on room air.  She reports that she was told to come to the ER today for a monoclonal antibody infusion.  Unfortunately, patient is not a candidate for monoclonal antibody infusion as currently this is being restricted to patients having symptom onset within  the last 7 days.  Fortunately however patient has been vaccinated and her symptoms seem to be improving and she is not hypoxic.  I discussed continued symptomatic treatment and return precautions were discussed as well.  She has a home pulse ox meter.           I wore an N95 mask, face shield, and gloves during this patient encounter.  Patient also wearing a surgical mask.  Hand hygeine performed before and after seeing the patient.    DIAGNOSIS  Final diagnoses:   COVID-19 virus infection         DISPOSITION  DISCHARGE    Patient discharged in stable condition.    Reviewed implications of results, diagnosis, meds, responsibility to follow up, warning signs and symptoms of possible worsening, potential complications and reasons to return to ER.    Patient/Family voiced understanding of above instructions.    Discussed plan for discharge, as there is no emergent indication for admission. Patient referred to primary care provider for BP management due to today's BP. Pt/family is agreeable and understands need for follow up and repeat testing.  Pt is aware that discharge does not mean that nothing is wrong but it indicates no emergency is present that requires admission and they must continue care with follow-up as given below or physician of their choice.     FOLLOW-UP  PATIENT CONNECTION - [facility] [id number]  [phone]  Call in 1 day           Medication List      No changes were made to your prescriptions during this visit.                   Latest Documented Vital Signs:  As of 23:43 EST  BP- 108/57 HR- 67 Temp- 97.9 °F (36.6 °C) O2 sat- 97%        --    Please note that portions of this were completed with a voice recognition program.          Kaden Carrion MD  01/14/22 9406

## 2022-01-15 NOTE — DISCHARGE INSTRUCTIONS
You have been seen for suspected or confirmed novel coronavirus, also known as COVID-19.    Your symptoms are mild today and you have been deemed appropriate for discharge home.  You should take Tylenol as needed for fever or aches.  You may also take over-the-counter cough medicines.    You should quarantine at home for the next 2 days (10 days from symptom onset).  If you have to be in contact with family, please wear the surgical mask provided to you today.  Wash your hands frequently.    Return to the ER should you develop worsening symptoms, particularly shortness of breath.

## 2022-11-08 ENCOUNTER — APPOINTMENT (OUTPATIENT)
Dept: WOMENS IMAGING | Facility: HOSPITAL | Age: 67
End: 2022-11-08

## 2022-11-08 PROCEDURE — 77066 DX MAMMO INCL CAD BI: CPT | Performed by: RADIOLOGY

## 2022-11-08 PROCEDURE — 77062 BREAST TOMOSYNTHESIS BI: CPT | Performed by: RADIOLOGY

## 2022-11-08 PROCEDURE — 76642 ULTRASOUND BREAST LIMITED: CPT | Performed by: RADIOLOGY

## 2022-11-08 PROCEDURE — G0279 TOMOSYNTHESIS, MAMMO: HCPCS | Performed by: RADIOLOGY

## 2023-08-22 ENCOUNTER — TELEPHONE (OUTPATIENT)
Dept: INTERNAL MEDICINE | Facility: CLINIC | Age: 68
End: 2023-08-22
Payer: COMMERCIAL

## 2023-08-22 NOTE — TELEPHONE ENCOUNTER
DAUGHTER OF MS. MACIAS HAS CALLED REQUESTING IF DR. MARSHALL WOULD CONSIDER TAKING HER MOTHER IN AS A NEW PATIENT. DR. MARSHALL CAME HIGHLY RECOMMENDED BY NEUROLOGIST DR. ALFARO. DAUGHTER IS REQUESTING A CALL BACK EITHER WAY TO LET HER KNOW IF DR. MARSHALL WILL ACCEPT PATIENT.    CALL BACK NUMBER [phone]